# Patient Record
(demographics unavailable — no encounter records)

---

## 2025-07-03 NOTE — PLAN
[TextEntry] : The patient was advised of the diagnosis. The natural history of the pathology was explained in full to the patient in layman's terms. All questions were answered. The risks and benefits of surgical and non-surgical treatment alternatives were explained in full to the patient.  Patient is being referred for physical therapy for various modalities.  Recommend over the counter medications on as needed basis.  Advised to use wrist splint.

## 2025-07-03 NOTE — PHYSICAL EXAM
[Normal Mood and Affect] : normal mood and affect [Able to Communicate] : able to communicate [Well Developed] : well developed [Well Nourished] : well nourished [NL (0-180)] : full passive forward flexion 0-180 degrees [NL (0-90)] : full external rotation 0-90 degrees [Dorsal Wrist] : dorsal wrist [NL (85)] : volarflexion 85 degrees [NL (90)] : supination 90 degrees [Wrist Volarflexion] : wrist volarflexion [5___] : volarflexion 5[unfilled]/5 [Type 3 acromion] : Type 3 acromion [de-identified] : - MAGDIEL [] : negative Finkelstein's [Right] : right wrist [There are no fractures, subluxations or dislocations. No significant abnormalities are seen] : There are no fractures, subluxations or dislocations. No significant abnormalities are seen [FreeTextEntry9] : pain with pronation [TWNoteComboBox7] : dorsiflexion 70 degrees

## 2025-07-03 NOTE — HISTORY OF PRESENT ILLNESS
[Sudden] : sudden [Sharp] : sharp [de-identified] : 07/03/25: Initial visit for this 42 year old female, complaining of spontaneous onset of right dorsal wrist pain x 2 months ago post fall in the kitchen. She also complains of some posterior shoulder pain which has been persistent since the fall. She has been taking Ibuprophen prn with minor relief. Worsened pain on  and grasp, exercising or lifting heavy.    [] : no [FreeTextEntry1] : right shoulder /right wrist [FreeTextEntry7] : down arm [FreeTextEntry9] : ibuprofen [de-identified] : grasping / lifting